# Patient Record
Sex: FEMALE | Race: WHITE
[De-identification: names, ages, dates, MRNs, and addresses within clinical notes are randomized per-mention and may not be internally consistent; named-entity substitution may affect disease eponyms.]

---

## 2019-02-10 ENCOUNTER — HOSPITAL ENCOUNTER (INPATIENT)
Dept: HOSPITAL 91 - FTE | Age: 39
LOS: 1 days | Discharge: HOME | DRG: 446 | End: 2019-02-11
Payer: COMMERCIAL

## 2019-02-10 ENCOUNTER — HOSPITAL ENCOUNTER (INPATIENT)
Dept: HOSPITAL 10 - FTE | Age: 39
LOS: 1 days | Discharge: HOME | DRG: 446 | End: 2019-02-11
Attending: INTERNAL MEDICINE | Admitting: INTERNAL MEDICINE
Payer: MEDICAID

## 2019-02-10 VITALS
HEIGHT: 60 IN | WEIGHT: 175.27 LBS | BODY MASS INDEX: 34.41 KG/M2 | HEIGHT: 60 IN | WEIGHT: 175.27 LBS | BODY MASS INDEX: 34.41 KG/M2

## 2019-02-10 VITALS — DIASTOLIC BLOOD PRESSURE: 64 MMHG | SYSTOLIC BLOOD PRESSURE: 108 MMHG | HEART RATE: 89 BPM | RESPIRATION RATE: 18 BRPM

## 2019-02-10 VITALS — RESPIRATION RATE: 18 BRPM | SYSTOLIC BLOOD PRESSURE: 101 MMHG | DIASTOLIC BLOOD PRESSURE: 61 MMHG | HEART RATE: 98 BPM

## 2019-02-10 DIAGNOSIS — Z53.29: ICD-10-CM

## 2019-02-10 DIAGNOSIS — K80.10: Primary | ICD-10-CM

## 2019-02-10 DIAGNOSIS — E66.9: ICD-10-CM

## 2019-02-10 LAB
ADD MAN DIFF?: NO
ADD UMIC: NO
ALANINE AMINOTRANSFERASE: 25 IU/L (ref 13–69)
ALBUMIN/GLOBULIN RATIO: 1.4
ALBUMIN: 5.2 G/DL (ref 3.3–4.9)
ALKALINE PHOSPHATASE: 83 IU/L (ref 42–121)
ANION GAP: 15 (ref 5–13)
ASPARTATE AMINO TRANSFERASE: 37 IU/L (ref 15–46)
BASOPHIL #: 0 10^3/UL (ref 0–0.1)
BASOPHILS %: 0.1 % (ref 0–2)
BILIRUBIN,DIRECT: 0 MG/DL (ref 0–0.2)
BILIRUBIN,TOTAL: 0.2 MG/DL (ref 0.2–1.3)
BLOOD UREA NITROGEN: 15 MG/DL (ref 7–20)
CALCIUM: 10.2 MG/DL (ref 8.4–10.2)
CARBON DIOXIDE: 23 MMOL/L (ref 21–31)
CHLORIDE: 105 MMOL/L (ref 97–110)
CREATININE: 0.6 MG/DL (ref 0.44–1)
EOSINOPHILS #: 0 10^3/UL (ref 0–0.5)
EOSINOPHILS %: 0 % (ref 0–7)
GLOBULIN: 3.7 G/DL (ref 1.3–3.2)
GLUCOSE: 137 MG/DL (ref 70–220)
HEMATOCRIT: 41 % (ref 37–47)
HEMOGLOBIN: 13.3 G/DL (ref 12–16)
IMMATURE GRANS #M: 0.07 10^3/UL (ref 0–0.03)
IMMATURE GRANS % (M): 0.5 % (ref 0–0.43)
LIPASE: 61 U/L (ref 23–300)
LYMPHOCYTES #: 1 10^3/UL (ref 0.8–2.9)
LYMPHOCYTES %: 6.9 % (ref 15–51)
MEAN CORPUSCULAR HEMOGLOBIN: 29.3 PG (ref 29–33)
MEAN CORPUSCULAR HGB CONC: 32.4 G/DL (ref 32–37)
MEAN CORPUSCULAR VOLUME: 90.3 FL (ref 82–101)
MEAN PLATELET VOLUME: 11.5 FL (ref 7.4–10.4)
MONOCYTE #: 0.6 10^3/UL (ref 0.3–0.9)
MONOCYTES %: 4 % (ref 0–11)
NEUTROPHIL #: 13.2 10^3/UL (ref 1.6–7.5)
NEUTROPHILS %: 88.5 % (ref 39–77)
NUCLEATED RED BLOOD CELLS #: 0 10^3/UL (ref 0–0)
NUCLEATED RED BLOOD CELLS%: 0 /100WBC (ref 0–0)
PLATELET COUNT: 264 10^3/UL (ref 140–415)
POTASSIUM: 3.5 MMOL/L (ref 3.5–5.1)
RED BLOOD COUNT: 4.54 10^6/UL (ref 4.2–5.4)
RED CELL DISTRIBUTION WIDTH: 13.3 % (ref 11.5–14.5)
SODIUM: 143 MMOL/L (ref 135–144)
TOTAL PROTEIN: 8.9 G/DL (ref 6.1–8.1)
UR ASCORBIC ACID: NEGATIVE MG/DL
UR BACTERIA: (no result) /HPF
UR BILIRUBIN (DIP): NEGATIVE MG/DL
UR BLOOD (DIP): NEGATIVE MG/DL
UR CLARITY: (no result)
UR COLOR: YELLOW
UR GLUCOSE (DIP): (no result) MG/DL
UR KETONES (DIP): (no result) MG/DL
UR LEUKOCYTE ESTERASE (DIP): NEGATIVE LEU/UL
UR MUCUS: (no result) /HPF
UR NITRITE (DIP): NEGATIVE MG/DL
UR PH (DIP): 7 (ref 5–9)
UR RBC: 1 /HPF (ref 0–5)
UR SPECIFIC GRAVITY (DIP): 1.02 (ref 1–1.03)
UR SQUAMOUS EPITHELIAL CELL: (no result) /HPF
UR TOTAL PROTEIN (DIP): NEGATIVE MG/DL
UR UROBILINOGEN (DIP): NEGATIVE MG/DL
UR WBC: 2 /HPF (ref 0–5)
WHITE BLOOD COUNT: 14.9 10^3/UL (ref 4.8–10.8)

## 2019-02-10 PROCEDURE — 80053 COMPREHEN METABOLIC PANEL: CPT

## 2019-02-10 PROCEDURE — 96375 TX/PRO/DX INJ NEW DRUG ADDON: CPT

## 2019-02-10 PROCEDURE — 85025 COMPLETE CBC W/AUTO DIFF WBC: CPT

## 2019-02-10 PROCEDURE — 81025 URINE PREGNANCY TEST: CPT

## 2019-02-10 PROCEDURE — 81001 URINALYSIS AUTO W/SCOPE: CPT

## 2019-02-10 PROCEDURE — 99285 EMERGENCY DEPT VISIT HI MDM: CPT

## 2019-02-10 PROCEDURE — 36415 COLL VENOUS BLD VENIPUNCTURE: CPT

## 2019-02-10 PROCEDURE — 83036 HEMOGLOBIN GLYCOSYLATED A1C: CPT

## 2019-02-10 PROCEDURE — 81003 URINALYSIS AUTO W/O SCOPE: CPT

## 2019-02-10 PROCEDURE — 76705 ECHO EXAM OF ABDOMEN: CPT

## 2019-02-10 PROCEDURE — 83690 ASSAY OF LIPASE: CPT

## 2019-02-10 PROCEDURE — 96365 THER/PROPH/DIAG IV INF INIT: CPT

## 2019-02-10 RX ADMIN — KETOROLAC TROMETHAMINE 1 MG: 30 INJECTION, SOLUTION INTRAMUSCULAR at 11:40

## 2019-02-10 RX ADMIN — POTASSIUM ACETATE 1 MLS/HR: 3.93 INJECTION, SOLUTION, CONCENTRATE INTRAVENOUS at 23:00

## 2019-02-10 RX ADMIN — POTASSIUM ACETATE 1 MLS/HR: 3.93 INJECTION, SOLUTION, CONCENTRATE INTRAVENOUS at 16:22

## 2019-02-10 RX ADMIN — CALCIUM GLUCONATE SCH MLS/HR: 94 INJECTION, SOLUTION INTRAVENOUS at 23:00

## 2019-02-10 RX ADMIN — PIPERACILLIN SODIUM AND TAZOBACTAM SODIUM 1 MLS/HR: 3; .375 INJECTION, POWDER, LYOPHILIZED, FOR SOLUTION INTRAVENOUS at 11:40

## 2019-02-10 RX ADMIN — PIPERACILLIN SODIUM AND TAZOBACTAM SODIUM 1 MLS/HR: 3; .375 INJECTION, POWDER, LYOPHILIZED, FOR SOLUTION INTRAVENOUS at 18:16

## 2019-02-10 RX ADMIN — THIAMINE HYDROCHLORIDE 1 MLS/HR: 100 INJECTION, SOLUTION INTRAMUSCULAR; INTRAVENOUS at 11:40

## 2019-02-10 RX ADMIN — CALCIUM GLUCONATE SCH MLS/HR: 94 INJECTION, SOLUTION INTRAVENOUS at 16:22

## 2019-02-10 RX ADMIN — PIPERACILLIN SODIUM AND TAZOBACTAM SODIUM SCH MLS/HR: 3; .375 INJECTION, POWDER, LYOPHILIZED, FOR SOLUTION INTRAVENOUS at 18:16

## 2019-02-10 NOTE — EN
Date/Time of Note


Date/Time of Note


DATE: 2/10/19 


TIME: 11:27





ER Progress Note


Observation Note





Subjective: This patient was evaluated by myself in conjunction with the PA.  


Briefly, this is a 38-year-old female presenting with right upper quadrant pain 


for a few days.  The patient was found to have cholecystitis.





Family history: As indicated on the initial history and physical of this ER 


visit





Objective:    Vital signs reviewed


Const:   No apparent distress, well-developed, well-nourished


Head:   Normocephalic, Atraumatic 


Eyes:   Normal Conjunctiva.


ENT:   Normal External Ears, Nose and Mouth.


Neck:   No meningismus.


Resp:   Symmetric chest wall wise, no audible wheezes


Cardio:   Regular rate


Abd:   No abdominal distension


Skin:   No petechiae or rashes


Ext:   No cyanosis, or edema


Neur:   Awake.  No facial droop.  Normal strength and sensation.





Assessment: Cholecystitis





MDM





The patient's presentation warrants further investigation. Previous medical 


records, if available, were reviewed.





LABS





The patient's laboratory testing was obtained and reviewed. No emergent 


treatment was required unless described below.





CBC:   Leukocytosis, concerning for an infectious etiology.  No anemia or 


thrombocytopenia.


CMP:   No E/o severe acidosis or alkalosis or renal failure or liver disease or 


diabetic ketoacidosis


Lipase:   No E/o pancreatitis





IMAGING





Imaging and Radiology interpretation reviewed.





US GB


FINDINGS: 


Pancreas: The head and body of the pancreas are unremarkable. Tail is obscured 


by shadowing bowel gas.


Liver: Liver demonstrates normal size and echotexture. No parenchymal lesions 


are identified. Normal directional flow toward the liver is demonstrated in the 


main portal vein.


Gallbladder: Multiple echogenic gallstones, including a non mobile stone within 


the neck of the gallbladder. The gallbladder wall measures 5 mm thickness. 


Sonographer reports positive Menendez's sign. There is trace pericholecystic 


fluid.


Biliary system: No significant dilatation of the intrahepatic or extrahepatic 


biliary system. Common bile duct measures 4 mm.


Right Kidney: Measures 10.6 cm in length. No hydronephrosis, intrarenal 


calcification or parenchymal lesion. No visible perinephric fluid. 


IMPRESSION: Cholelithiasis and non mobile stone within the neck of the 


gallbladder. Mild wall thickening, trace pericholecystic fluid and focal 


tenderness consistent with acute cholecystitis.


Electronically viewed and signed by Physician Ese on 02/10/2019 


11:01 





TREATMENT/DISPOSITION





The patient presents for acute cholecystitis.  The patient was given Zosyn in 


the emergency department.  The patient does have a leukocytosis, but her vital 


signs are stable.  She does not meet criteria for systemic inflammatory response


syndrome.  I have low suspicion for endorgan damage.  I do not believe the 


patient is septic and I do not feel that a full septic workup is required.





At this time, I feel that the patient requires admission for further evaluation 


and management. The patient will be admitted to Panel in accordance with the 


patient's insurance.  The patient was accepted by Dr. Monreal at 1415PM on 


2/10/2019.





The on-call general surgeon, Dr. Mcgee, was consulted on the case.  The 


patient will be evaluated for likely operative intervention.





Please see PA note for further detail.





Disclaimer: Inadvertent spelling and grammatical errors are likely due to 


EHR/dictation software use and do not reflect on the overall quality of patient 


care. Note that the electronic time recorded on this note does not necessarily 


reflect the actual time of the patient encounter.











RODRICK ROSAS MD              Feb 10, 2019 11:34

## 2019-02-10 NOTE — CONS
Assessment/Plan


Assessment/Plan


Hospital Course (Demo Recall)


1.  Symptomatic cholelithiasis with likely cholecystitis:


-Discussed surgical option with patient.  Patient currently refusing surgery at 


this time.>  We will continue to monitor


-\Antibiotics


-Diet okay-low-fat low-cholesterol


2.  Leukocytosis:


-As above


3.  Abdominal pain:


-Pain management


4.  Obesity BMI: 34


-diet and exercise optimization


-encourage weight loss


5.  Schizophrenia


-Psychiatric optimization








Thank you.  Patient seen and examined in collaboration with Dr. Yang Cordova.





Consultation Date/Type/Reason


Admit Date/Time





Date of Consultation:  Feb 10, 2019


Type of Consult


Surgical


Reason for Consultation


Abdominal pain, cholelithiasis likely cholecystitis


Requesting Provider:  MELBA COHEN PA-C


Date/Time of Note


DATE: 2/10/19 


TIME: 17:06





Hx of Present Illness


Chandni Cisneros is a 38-year-old woman with past medical history of schizophrenia 


and left eye surgery who presented to the ED with reports of abdominal pain 


times 2 days.  Abdominal pain is predominantly in the right upper quadrant 


strong and persistent in nature.  Aggravating factors include pain with eating. 


Associated symptoms include nausea with vomiting yellow emesis.  She denies 


fevers, chills, congested cough, chest pain, palpitations, diarrhea, change in 


bowel or bladder habits, hematochezia, melena, hematemesis, skin or scleral 


changes.  Laboratory findings were significant for leukocytosis.  Ultrasound of 


the abdomen shows cholelithiasis with nonmobile stone within the neck of the 


gallbladder with mild wall thickening and trace pericholecystic fluid.  General 


surgery was asked to evaluate.


12 point review of systems was performed and is negative except as stated in 


HPI.





Past Medical History


Obesity


As above


Medications





Current Medications


Ondansetron HCl (Zofran Inj) 4 mg BRIDGE ORDER PRN IV NAUSEA/VOMITING;  Start 


2/10/19 at 14:30;  Stop 2/11/19 at 14:29


Acetaminophen (Tylenol Tab) 650 mg ER BRIDGE PRN PO .MILD PAIN 1-3 OR TEMP;  


Start 2/10/19 at 14:30;  Stop 2/11/19 at 14:29


Sodium Chloride 1,000 ml @  125 mls/hr Q8H IV  Last administered on 2/10/19at 


16:22; Admin Dose 125 MLS/HR;  Start 2/10/19 at 15:00


IV Flush (NS 3 ml) 3 ml PER PROTOCOL IV ;  Start 2/10/19 at 15:00


Morphine Sulfate (morphine) 2 mg Q4H  PRN IV .SEVERE PAIN 7-10;  Start 2/10/19 


at 15:00


Piperacillin Sod/ Tazobactam Sod 100 ml @  25 mls/hr TID@02,10,18 IVPB ;  Start 


2/10/19 at 18:00


Allergies:  


Coded Allergies:  


     No Known Allergy (Unverified , 12/8/14)





Past Surgical History


As above





Family History


Significant Family History:  no pertinent family hx





Social History


Alcohol Use:  none


Smoking Status:  Never smoker


Drug Use:  none





Exam/Review of Systems


Exam


Vitals





Vital Signs


  Date      Temp  Pulse  Resp  B/P (MAP)   Pulse Ox  O2          O2 Flow    FiO2


Time                                                 Delivery    Rate


   2/10/19  98.8     83    18      108/61        98  Room Air


     15:08                           (77)





Constitutional:  alert, oriented, well developed, obese


Psych:  nl mood/affect, anxiety (Min)


Head:  normocephalic, atraumatic


Eyes:  nl conjunctiva, EOMI, nl lids, nl sclera


ENMT:  nl external ears & nose, nl lips & teeth, nl nasal mucosa & septum, 


mucosa pink and moist


Neck:  supple, non-tender; 


   No jvd


Respiratory:  normal air movement; 


   No congested cough


Cardiovascular:  regular rate and rhythm, nl pulses


Gastrointestinal:  soft, distended (Minimal), tender (Right upper quadrant; 


negative Menendez's by palpation); 


   No firm


Genitourinary - Female:  nl external genitalia


Musculoskeletal:  nl extremities to inspection, nl gait and stance


Extremities:  normal pulses


Neurological:  nl mental status, nl speech, nl strength


Skin:  No rash or lesions


Lymph:  nl lymph nodes





Results


Result Diagram:  


2/10/19 1026                                                                    


           2/10/19 1026





Results 24hrs





Laboratory Tests


Test
                                2/10/19
10:15  2/10/19
10:26  2/10/19
10:34


Urine Color                     YELLOW


Urine Clarity
                  SLIGHTLY
CLOUDY  A  
              



Urine pH                                     7.0


Urine Specific Gravity                     1.018


Urine Ketones                   TRACE  A


Urine Nitrite                   NEGATIVE


Urine Bilirubin                 NEGATIVE


Urine Urobilinogen              NEGATIVE


Urine Leukocyte Esterase        NEGATIVE


Urine Microscopic RBC                          1


Urine Microscopic WBC                          2


Urine Squamous                  FEW  
              
              



Epithelial
Cells


Urine Bacteria                  FEW  A


Urine Mucus                     FEW  A


Urine Hemoglobin                NEGATIVE


Urine Glucose                                1+  H


Urine Total Protein             NEGATIVE


White Blood Count                                         14.9  H


Red Blood Count                                           4.54  #


Hemoglobin                                                13.3  #


Hematocrit                                                41.0  #


Mean Corpuscular Volume                                    90.3


Mean Corpuscular Hemoglobin                                29.3


Mean Corpuscular                
                         32.4  
  



Hemoglobin
Concent


Red Cell Distribution Width                                13.3


Platelet Count                                              264


Mean Platelet Volume                                      11.5  H


Immature Granulocytes %                                  0.500  H


Neutrophils %                                             88.5  H


Lymphocytes %                                              6.9  L


Monocytes %                                                 4.0


Eosinophils %                                               0.0


Basophils %                                                 0.1


Nucleated Red Blood Cells %                                 0.0


Immature Granulocytes #                                  0.070  H


Neutrophils #                                             13.2  H


Lymphocytes #                                               1.0


Monocytes #                                                 0.6


Eosinophils #                                               0.0


Basophils #                                                 0.0


Nucleated Red Blood Cells #                                 0.0


Sodium Level                                                143


Potassium Level                                             3.5


Chloride Level                                              105


Carbon Dioxide Level                                         23


Anion Gap                                                   15  H


Blood Urea Nitrogen                                          15


Creatinine                                                 0.60


Est Glomerular Filtrat          
                   > 60  
        



Rate
mL/min


Glucose Level                                               137


Calcium Level                                              10.2


Total Bilirubin                                             0.2


Direct Bilirubin                                           0.00


Indirect Bilirubin                                          0.2


Aspartate Amino                 
                           37  
  



Transf
(AST/SGOT)


Alanine                         
                           25  
  



Aminotransferase
(ALT/SGPT)


Alkaline Phosphatase                                         83


Total Protein                                              8.9  H


Albumin                                                    5.2  H


Globulin                                                  3.70  H


Albumin/Globulin Ratio                                     1.40


Lipase                                                       61


POC Beta HCG, Qualitative                                          NEGATIVE








Medications


Medication





Current Medications


Ondansetron HCl (Zofran Inj) 4 mg BRIDGE ORDER PRN IV NAUSEA/VOMITING;  Start 


2/10/19 at 14:30;  Stop 2/11/19 at 14:29


Acetaminophen (Tylenol Tab) 650 mg ER BRIDGE PRN PO .MILD PAIN 1-3 OR TEMP;  


Start 2/10/19 at 14:30;  Stop 2/11/19 at 14:29


Sodium Chloride 1,000 ml @  125 mls/hr Q8H IV  Last administered on 2/10/19at 


16:22; Admin Dose 125 MLS/HR;  Start 2/10/19 at 15:00


IV Flush (NS 3 ml) 3 ml PER PROTOCOL IV ;  Start 2/10/19 at 15:00


Morphine Sulfate (morphine) 2 mg Q4H  PRN IV .SEVERE PAIN 7-10;  Start 2/10/19 


at 15:00


Piperacillin Sod/ Tazobactam Sod 100 ml @  25 mls/hr TID@02,10,18 IVPB ;  Start 


2/10/19 at 18:00











LILIAN LAINEZ NP       Feb 10, 2019 17:14

## 2019-02-10 NOTE — HP
Date/Time of Note


Date/Time of Note


DATE: 2/10/19 


TIME: 15:31





Assessment/Plan


VTE Prophylaxis


Pharmacological prophylaxis:  heparin





Lines/Catheters


IV Catheter Type (from Nrsg):  Saline Lock





Assessment/Plan


Hospital Course


37 yo female without pmh presenting with cholecystitis


- IV zosyn


- Fluids


- Pain control


- Dr Cordova consulted


- NPO for now


Result Diagram:  


2/10/19 1026                                                                    


           2/10/19 1026





Results 24hrs





Laboratory Tests


Test
                                2/10/19
10:15  2/10/19
10:26  2/10/19
10:34


Urine Color                     YELLOW


Urine Clarity
                  SLIGHTLY
CLOUDY  A  
              



Urine pH                                     7.0


Urine Specific Gravity                     1.018


Urine Ketones                   TRACE  A


Urine Nitrite                   NEGATIVE


Urine Bilirubin                 NEGATIVE


Urine Urobilinogen              NEGATIVE


Urine Leukocyte Esterase        NEGATIVE


Urine Microscopic RBC                          1


Urine Microscopic WBC                          2


Urine Squamous                  FEW  
              
              



Epithelial
Cells


Urine Bacteria                  FEW  A


Urine Mucus                     FEW  A


Urine Hemoglobin                NEGATIVE


Urine Glucose                                1+  H


Urine Total Protein             NEGATIVE


White Blood Count                                         14.9  H


Red Blood Count                                           4.54  #


Hemoglobin                                                13.3  #


Hematocrit                                                41.0  #


Mean Corpuscular Volume                                    90.3


Mean Corpuscular Hemoglobin                                29.3


Mean Corpuscular                
                         32.4  
  



Hemoglobin
Concent


Red Cell Distribution Width                                13.3


Platelet Count                                              264


Mean Platelet Volume                                      11.5  H


Immature Granulocytes %                                  0.500  H


Neutrophils %                                             88.5  H


Lymphocytes %                                              6.9  L


Monocytes %                                                 4.0


Eosinophils %                                               0.0


Basophils %                                                 0.1


Nucleated Red Blood Cells %                                 0.0


Immature Granulocytes #                                  0.070  H


Neutrophils #                                             13.2  H


Lymphocytes #                                               1.0


Monocytes #                                                 0.6


Eosinophils #                                               0.0


Basophils #                                                 0.0


Nucleated Red Blood Cells #                                 0.0


Sodium Level                                                143


Potassium Level                                             3.5


Chloride Level                                              105


Carbon Dioxide Level                                         23


Anion Gap                                                   15  H


Blood Urea Nitrogen                                          15


Creatinine                                                 0.60


Est Glomerular Filtrat          
                   > 60  
        



Rate
mL/min


Glucose Level                                               137


Calcium Level                                              10.2


Total Bilirubin                                             0.2


Direct Bilirubin                                           0.00


Indirect Bilirubin                                          0.2


Aspartate Amino                 
                           37  
  



Transf
(AST/SGOT)


Alanine                         
                           25  
  



Aminotransferase
(ALT/SGPT)


Alkaline Phosphatase                                         83


Total Protein                                              8.9  H


Albumin                                                    5.2  H


Globulin                                                  3.70  H


Albumin/Globulin Ratio                                     1.40


Lipase                                                       61


POC Beta HCG, Qualitative                                          NEGATIVE








HPI/ROS


Admit Date/Time


Admit Date/Time








Hx of Present Illness


37 yo female without pmh who presents with two days abdominal pain


Patient generally in good health.  Over past two days has developed RUQ pain 


associated with vomiting.  Came to ED where imaging shows cholecystitis.  


Started on zosyn.  Given pain control.  Currently feels well no complaints





PMH/Family/Social


Past Medical History


Medical History:  no pertinent history


Medications





Current Medications


Ondansetron HCl (Zofran Inj) 4 mg BRIDGE ORDER PRN IV NAUSEA/VOMITING;  Start 


2/10/19 at 14:30;  Stop 2/11/19 at 14:29


Acetaminophen (Tylenol Tab) 650 mg ER BRIDGE PRN PO .MILD PAIN 1-3 OR TEMP;  


Start 2/10/19 at 14:30;  Stop 2/11/19 at 14:29


Sodium Chloride 1,000 ml @  125 mls/hr Q8H IV ;  Start 2/10/19 at 15:00


IV Flush (NS 3 ml) 3 ml PER PROTOCOL IV ;  Start 2/10/19 at 15:00


Morphine Sulfate (morphine) 2 mg Q4H  PRN IV .SEVERE PAIN 7-10;  Start 2/10/19 


at 15:00


Piperacillin Sod/ Tazobactam Sod 100 ml @  25 mls/hr TID@02,10,18 IVPB ;  Start 


2/10/19 at 18:00


Coded Allergies:  


     No Known Allergy (Unverified , 12/8/14)





Past Surgical History


Past Surgical Hx:  no surgical history





Family History


Significant Family History:  no pertinent family hx





Social History


Alcohol Use:  none


Smoking Status:  Never smoker


Drug Use:  none





Exam/Review of Systems


Vital Signs


Vitals





Vital Signs


  Date      Temp  Pulse  Resp  B/P (MAP)   Pulse Ox  O2          O2 Flow    FiO2


Time                                                 Delivery    Rate


   2/10/19  98.8     83    18      108/61        98  Room Air


     15:08                           (77)








Exam


Constitutional:  alert, oriented, well developed


Psych:  no complaints, nl mood/affect


Head:  normocephalic, atraumatic


Eyes:  nl conjunctiva, EOMI, nl lids, nl sclera, PERRL


ENMT:  nl external ears & nose, nl lips & teeth, nl nasal mucosa & septum


Neck:  supple, non-tender


Respiratory:  clear to auscultation, normal air movement


Cardiovascular:  regular rate and rhythm, nl pulses


Gastrointestinal:  soft, nl liver, spleen, non-tender


Musculoskeletal:  nl extremities to inspection


Extremities:  normal pulses


Neurological:  CNS II-XII intact, nl mental status, nl speech, nl strength


Skin:  nl turgor; 


   No rash or lesions


Lymph:  nl lymph nodes











MARIAH SILVER MD          Feb 10, 2019 15:33

## 2019-02-10 NOTE — NUR
EOSS: PT'S HEMODYNAMICS AND RESPIR. STATUS ARE STABLE. NO COMPLICATIONS. NO CHANGES IN 
CONDITION. WILL CONT. MONITORING. WILL CONT. W/ THE PLAN OF CARE.

## 2019-02-10 NOTE — NUR
PT ADMITTED FROM ER.A,A,OX4. NOT IN ANY ACUTE DISTRESS. THE PLAN OF CARE DISCUSSED W/ THE 
PT, VERBALIZED UNDERSTANDING. CALL LIGHT IN REACH. PT ENCOURAGED TO CALL FOR ASSISTANCE. 
WILL CONT. MONITORING. WILL CONT. W/ THE PLAN OF CARE.

## 2019-02-10 NOTE — ERD
ER Documentation


Chief Complaint


Chief Complaint





NAUSEA AND VOMITING, ABD PAIN X2 DAYS





HPI


38-year-old female presenting with nausea and vomiting and epigastric abdominal 


pain times 2 days.  Patient has no fevers.  She is never had this pain before.  


Denies any chest pain or shortness of breath.  Has not taken medications for 


symptoms.  Medical history: Schizophrenia.  Surgical history left eye surgery.  


NKDA.  Social history denies





ROS


All systems reviewed and are negative except as per history of present illness.





Allergies


Allergies:  


Coded Allergies:  


     No Known Allergy (Unverified , 14)





PMhx/Soc


Medical and Surgical Hx:  pt denies Medical Hx, pt denies Surgical Hx


Hx Psychiatric Problems:  Yes (UNKNOWN)


Hx Alcohol Use:  No


Hx Substance Use:  No


Hx Tobacco Use:  No





FmHx


Family History:  No diabetes, No coronary disease, No other





Physical Exam


Vitals





Vital Signs


  Date      Temp  Pulse  Resp  B/P (MAP)   Pulse Ox  O2          O2 Flow    FiO2


Time                                                 Delivery    Rate


   2/10/19  98.5     96    18      142/65       100


     09:56                           (90)





Physical Exam


GENERAL: The patient is well-appearing, well-nourished, in no acute distress


HEENT: Atraumatic.  Conjunctivae are pink.  Pupils equal, round, and reactive to


light.  There is no scleral icterus.  Tympanic membranes clear bilaterally.  


Oropharynx clear.  


NECK: C-spine is soft and supple.  There is no meningismus.  There is no 


cervical lymphadenopathy. 


CHEST: Clear to auscultation bilaterally.  There are no rales, wheezes or 


rhonchi.


HEART: Regular rate and rhythm.  No murmurs, clicks, rubs or gallops.  


ABDOMEN: Normal active bowel sounds.  No distention.  No organomegaly.  Tender 


to palpation in epigastric region.


Result Diagram:  


2/10/19 1026                                                                    


           2/10/19 1026





Results 24 hrs





Laboratory Tests


Test
                               2/10/19
10:15   2/10/19
10:26  2/10/19
10:34


Urine Color                       YELLOW


Urine Clarity
                    SLIGHTLY
CLOUDY  
               



Urine pH                                     7.0


Urine Specific Gravity                     1.018


Urine Ketones                     TRACE mg/dL


Urine Nitrite                     NEGATIVE mg/dL


Urine Bilirubin                   NEGATIVE mg/dL


Urine Urobilinogen                NEGATIVE mg/dL


Urine Leukocyte Esterase
         NEGATIVE
Conor/ul  
               



Urine Microscopic RBC                     1 /HPF


Urine Microscopic WBC                     2 /HPF


Urine Squamous Epithelial
Cells   FEW /HPF 
       
               



Urine Bacteria                    FEW /HPF


Urine Mucus                       FEW /HPF


Urine Hemoglobin                  NEGATIVE mg/dL


Urine Glucose                           1+ mg/dL


Urine Total Protein               NEGATIVE mg/dl


White Blood Count                                   14.9 10^3/ul


Red Blood Count                                     4.54 10^6/ul


Hemoglobin                                             13.3 g/dl


Hematocrit                                                41.0 %


Mean Corpuscular Volume                                  90.3 fl


Mean Corpuscular Hemoglobin                              29.3 pg


Mean Corpuscular                  
                   32.4 g/dl 
  



Hemoglobin
Concent


Red Cell Distribution Width                               13.3 %


Platelet Count                                       264 10^3/UL


Mean Platelet Volume                                     11.5 fl


Immature Granulocytes %                                  0.500 %


Neutrophils %                                             88.5 %


Lymphocytes %                                              6.9 %


Monocytes %                                                4.0 %


Eosinophils %                                              0.0 %


Basophils %                                                0.1 %


Nucleated Red Blood Cells %                          0.0 /100WBC


Immature Granulocytes #                            0.070 10^3/ul


Neutrophils #                                       13.2 10^3/ul


Lymphocytes #                                        1.0 10^3/ul


Monocytes #                                          0.6 10^3/ul


Eosinophils #                                        0.0 10^3/ul


Basophils #                                          0.0 10^3/ul


Nucleated Red Blood Cells #                          0.0 10^3/ul


Sodium Level                                          143 mmol/L


Potassium Level                                       3.5 mmol/L


Chloride Level                                        105 mmol/L


Carbon Dioxide Level                                   23 mmol/L


Anion Gap                                                     15


Blood Urea Nitrogen                                     15 mg/dl


Creatinine                                            0.60 mg/dl


Est Glomerular Filtrat            
                > 60 mL/min 
   



Rate
mL/min


Glucose Level                                          137 mg/dl


Calcium Level                                         10.2 mg/dl


Total Bilirubin                                        0.2 mg/dl


Direct Bilirubin                                      0.00 mg/dl


Indirect Bilirubin                                     0.2 mg/dl


Aspartate Amino                   
                     37 IU/L 
  



Transf
(AST/SGOT)


Alanine                           
                     25 IU/L 
  



Aminotransferase
(ALT/SGPT)


Alkaline Phosphatase                                     83 IU/L


Total Protein                                           8.9 g/dl


Albumin                                                 5.2 g/dl


Globulin                                               3.70 g/dl


Albumin/Globulin Ratio                                      1.40


Lipase                                                    61 U/L


POC Beta HCG, Qualitative                                          NEGATIVE





Current Medications


 Medications
   Dose
          Sig/Naya
       Start Time
   Status  Last


 (Trade)       Ordered        Route
 PRN     Stop Time              Admin
Dose


                              Reason                                Admin


 Sodium         1,000 ml @ 
   Q1H STAT
      2/10/19       DC           2/10/19


Chloride       1,000 mls/hr   IV
            11:30
                       11:40



                                             2/10/19 12:29


 Ketorolac
     30 mg          ONCE  STAT
    2/10/19       DC           2/10/19


Tromethamine
                 IV
            11:30
                       11:40



 (Toradol)                                   2/10/19 11:31


 Piperacillin   100 ml @ 
     ONCE  ONCE
    2/10/19       DC           2/10/19


Sod/
          200 mls/hr     IVPB
          12:00
                       11:40



Tazobactam                                   2/10/19 12:29


Sod


 Ondansetron    4 mg           BRIDGE ORDER   2/10/19                



HCl
  (Zofran                 PRN
 IV
       14:30



Inj)                          NAUSEA/VOMITI  19 14:29


                              NG


                650 mg         ER BRIDGE      2/10/19                



Acetaminophen                 PRN
 PO
       14:30




  (Tylenol                   .MILD PAIN     19 14:29


Tab)                          1-3 OR TEMP


 Sodium         1,000 ml @ 
   Q8H
 IV
       2/10/19       UNV      



Chloride       125 mls/hr                    15:00



 IV Flush
      3 ml           PER            2/10/19       UNV      



(NS 3 ml)                     PROTOCOL
 IV
  15:00



 Morphine       2 mg           Q4H  PRN
      2/10/19       UNV      



Sulfate
                      IV
 .SEVERE    15:00



(morphine)                    PAIN 7-10


 Piperacillin   100 ml @ 
                    2/10/19       UNV      



Sod/
          25 mls/hr      TID@02,10,
  18:00



Tazobactam                    IVPB



Sod








Procedures/MDM


                            DIAGNOSTIC IMAGING REPORT





Patient: JACKELINE TESFAYE   : 1980   Age: 38  Sex: F                        


       MR #:    [id number]   Acct #:   [id number]    DOS: 02/10/19 1015


Ordering MD: MONSERRAT COHEN PA-C   Location:  [facility]   Room/Bed:            


                               


                                        


PROCEDURE:   Ultrasound gallbladder


 


CLINICAL INDICATION:  abdominal pain    


 


TECHNIQUE:  Gray scale, color flow and Doppler ultrasound images of the abdomen.





 


COMPARISON:   None 


 


FINDINGS:


 


Pancreas: The head and body of the pancreas are unremarkable. Tail is obscured 


by shadowing bowel gas.


 


Liver: Liver demonstrates normal size and echotexture. No parenchymal lesions 


are identified. Normal directional flow toward the liver is demonstrated in the 


main portal vein.


 


Gallbladder: Multiple echogenic gallstones, including a non mobile stone within 


the neck of the gallbladder. The gallbladder wall measures 5 mm thickness. 


Sonographer reports positive Menendez's sign. There is trace pericholecystic 


fluid.


 


Biliary system: No significant dilatation of the intrahepatic or extrahepatic 


biliary system. Common bile duct measures 4 mm.


 


Right Kidney: Measures 10.6 cm in length. No hydronephrosis, intrarenal 


calcification or parenchymal lesion. No visible perinephric fluid. 


Additional findings: None


 


 


IMPRESSION:


 


Cholelithiasis and non mobile stone within the neck of the gallbladder. Mild 


wall thickening, trace pericholecystic fluid and focal tenderness consistent 


with acute cholecystitis.


 





ER Course: 1 L normal saline given ED.  Toradol given ED.  Dr. Limon help 


facilitate admission and patient was admitted and will have surgical cons


ultation.





MDM: 38-year-old female presenting with findings consistent with cholecystitis. 


Patient will be admitted for higher level of care and surgical consultation.  I 


have low suspicion for other abdominal emergencies including but not limited to 


choledocholithiasis, pancreatitis or choledocholithiasis.  I have low suspicion 


for bowel obstruction or appendicitis.  Patient is admitted for higher level 


care and surgical consultation.  Patient is stable at the time of admission.  I 


have low suspicion for cardiac or pulmonary emergency.  Patient understood and 


complied. All questions answered at discharge











MELBA COHEN PA-C       Feb 10, 2019 15:11

## 2019-02-11 VITALS — SYSTOLIC BLOOD PRESSURE: 103 MMHG | RESPIRATION RATE: 18 BRPM | DIASTOLIC BLOOD PRESSURE: 57 MMHG | HEART RATE: 94 BPM

## 2019-02-11 VITALS — RESPIRATION RATE: 18 BRPM | SYSTOLIC BLOOD PRESSURE: 104 MMHG | HEART RATE: 88 BPM | DIASTOLIC BLOOD PRESSURE: 69 MMHG

## 2019-02-11 VITALS — RESPIRATION RATE: 18 BRPM | SYSTOLIC BLOOD PRESSURE: 102 MMHG | HEART RATE: 108 BPM | DIASTOLIC BLOOD PRESSURE: 58 MMHG

## 2019-02-11 LAB
ADD MAN DIFF?: NO
ALANINE AMINOTRANSFERASE: 28 IU/L (ref 13–69)
ALBUMIN/GLOBULIN RATIO: 1.19
ALBUMIN: 3.7 G/DL (ref 3.3–4.9)
ALKALINE PHOSPHATASE: 54 IU/L (ref 42–121)
ANION GAP: 6 (ref 5–13)
ASPARTATE AMINO TRANSFERASE: 25 IU/L (ref 15–46)
BASOPHIL #: 0 10^3/UL (ref 0–0.1)
BASOPHILS %: 0.1 % (ref 0–2)
BILIRUBIN,DIRECT: 0 MG/DL (ref 0–0.2)
BILIRUBIN,TOTAL: 0.6 MG/DL (ref 0.2–1.3)
BLOOD UREA NITROGEN: 10 MG/DL (ref 7–20)
CALCIUM: 8.8 MG/DL (ref 8.4–10.2)
CARBON DIOXIDE: 23 MMOL/L (ref 21–31)
CHLORIDE: 112 MMOL/L (ref 97–110)
CREATININE: 0.75 MG/DL (ref 0.44–1)
EOSINOPHILS #: 0 10^3/UL (ref 0–0.5)
EOSINOPHILS %: 0.1 % (ref 0–7)
GLOBULIN: 3.1 G/DL (ref 1.3–3.2)
GLUCOSE: 105 MG/DL (ref 70–220)
HEMATOCRIT: 35.2 % (ref 37–47)
HEMOGLOBIN A1C: 5.7 % (ref 0–5.9)
HEMOGLOBIN: 11.3 G/DL (ref 12–16)
IMMATURE GRANS #M: 0.04 10^3/UL (ref 0–0.03)
IMMATURE GRANS % (M): 0.4 % (ref 0–0.43)
LYMPHOCYTES #: 2.7 10^3/UL (ref 0.8–2.9)
LYMPHOCYTES %: 29.2 % (ref 15–51)
MEAN CORPUSCULAR HEMOGLOBIN: 29.3 PG (ref 29–33)
MEAN CORPUSCULAR HGB CONC: 32.1 G/DL (ref 32–37)
MEAN CORPUSCULAR VOLUME: 91.2 FL (ref 82–101)
MEAN PLATELET VOLUME: 11.9 FL (ref 7.4–10.4)
MONOCYTE #: 0.9 10^3/UL (ref 0.3–0.9)
MONOCYTES %: 10.3 % (ref 0–11)
NEUTROPHIL #: 5.4 10^3/UL (ref 1.6–7.5)
NEUTROPHILS %: 59.9 % (ref 39–77)
NUCLEATED RED BLOOD CELLS #: 0 10^3/UL (ref 0–0)
NUCLEATED RED BLOOD CELLS%: 0 /100WBC (ref 0–0)
PLATELET COUNT: 217 10^3/UL (ref 140–415)
POTASSIUM: 3.3 MMOL/L (ref 3.5–5.1)
RED BLOOD COUNT: 3.86 10^6/UL (ref 4.2–5.4)
RED CELL DISTRIBUTION WIDTH: 13.6 % (ref 11.5–14.5)
SODIUM: 141 MMOL/L (ref 135–144)
TOTAL PROTEIN: 6.8 G/DL (ref 6.1–8.1)
WHITE BLOOD COUNT: 9.1 10^3/UL (ref 4.8–10.8)

## 2019-02-11 RX ADMIN — PIPERACILLIN SODIUM AND TAZOBACTAM SODIUM 1 MLS/HR: 3; .375 INJECTION, POWDER, LYOPHILIZED, FOR SOLUTION INTRAVENOUS at 10:28

## 2019-02-11 RX ADMIN — PIPERACILLIN SODIUM AND TAZOBACTAM SODIUM SCH MLS/HR: 3; .375 INJECTION, POWDER, LYOPHILIZED, FOR SOLUTION INTRAVENOUS at 10:28

## 2019-02-11 RX ADMIN — POTASSIUM ACETATE 1 MLS/HR: 3.93 INJECTION, SOLUTION, CONCENTRATE INTRAVENOUS at 04:16

## 2019-02-11 RX ADMIN — POTASSIUM CHLORIDE 1 MEQ: 1500 TABLET, EXTENDED RELEASE ORAL at 10:26

## 2019-02-11 RX ADMIN — PIPERACILLIN SODIUM AND TAZOBACTAM SODIUM 1 MLS/HR: 3; .375 INJECTION, POWDER, LYOPHILIZED, FOR SOLUTION INTRAVENOUS at 01:21

## 2019-02-11 RX ADMIN — PIPERACILLIN SODIUM AND TAZOBACTAM SODIUM SCH MLS/HR: 3; .375 INJECTION, POWDER, LYOPHILIZED, FOR SOLUTION INTRAVENOUS at 01:21

## 2019-02-11 RX ADMIN — CALCIUM GLUCONATE SCH MLS/HR: 94 INJECTION, SOLUTION INTRAVENOUS at 04:16

## 2019-02-11 NOTE — NUR
EOSS:



PATIENT BEEN ASSESSED FOR PAIN.  PATIENT DENIES PAIN.  TOLERATING CLEAR LIQUIDS. REMAIN IN 
ZOSYN ANTIBIOTICS AND IVF INFUSING.  HOURLY ROUNDING RENDERED.  FALL PRECAUTION OBSERVED.  
CALL LIGHT WITHIN REACH.

## 2019-02-11 NOTE — PDOCDIS
Discharge Instructions


CONDITION


                 [id number]
Patient Condition:  [id number]
Good








HOME CARE INSTRUCTIONS:


                [id number]
Diet Instructions:  [id number]
Regular








ACTIVITY:


          [id number]
Activity Restrictions:  [id number]
No Restrictions








FOLLOW UP/APPOINTMENTS


Follow-up Plan


FOLLOW UP WITH YOUR PCP IN 1-2 WEEKS











KURT SHIRLEY                  Feb 11, 2019 10:22

## 2019-02-11 NOTE — DS
Date/Time of Note


Date/Time of Note


DATE: 2/11/19 


TIME: 14:59





Discharge Summary


Admission/Discharge Info


Admit Date/Time


Feb 10, 2019 at 14:23


Discharge Date/Time


Feb 11, 2019 at 14:30


Discharge Diagnosis


1.  Symptomatic cholelithiasis with likely cholecystitis:


-Surgery consultation appreciated, Patient is refusing surgery at this time


-Status post IV antibiotics


-DC with p.o. antibiotics


-Pain has resolved


-Outpatient follow-up





2.  Leukocytosis: Resolved


-As above





3.  Obesity BMI: 34


-diet and exercise optimization


-encourage weight loss


Patient Condition:  Good


Hospital Course


Patient is a 38-year-old female with a history of obesity, patient presents with


abdominal pain and was found to have cholelithiasis with likely cholecystitis.  


Patient was seen by surgery and patient did prefer not to have surgery during 


this admission, patient's leukocytosis and abdominal pain did resolve with IV 


antibiotics.  Patient did understand the risk of not having surgery and 


understood that she should follow-up with her PCP and a surgeon as an 


outpatient.  Patient was stable for DC, on day of discharge patient's vitals, 


labs of exam are stable.


Home Meds


Active Scripts


Metronidazole* (Flagyl*) 500 Mg Tablet, 500 MG PO TID for 5 Days, #15 TAB


   Prov:KURT SHIRLEY         2/11/19


Ciprofloxacin Hcl* (Ciprofloxacin Hcl*) 500 Mg Tablet, 500 MG PO BID for 5 Days,


#10 TAB


   Prov:KURT SHIRELY         2/11/19


Follow-up Plan


FOLLOW UP WITH YOUR PCP IN 1-2 WEEKS


Primary Care Provider


Care Physician No Primary


Time spent on discharge:   > 30 minutes











KURT SHIRLEY                  Feb 11, 2019 15:02

## 2019-02-11 NOTE — NUR
DISCHARGE:



Patient given discharge instructions after seen by Dr. Santos with prescriptions for Cipro 
and Flagyl PO. Instructed patient re: taking them. Patient advised to see primary doctor 
post-discharge for follow up and instructed patient to revisit the ER once symptoms recur. 
Patient appeared/stated to be comfortable upon discharge. Patient wants to walk downstairs 
to meet her discharge ride. IV heplock removed from patient's left AC site without 
complications, no bleeding, no oozing- bandaide placed to site.

## 2019-02-11 NOTE — PN
Date/Time of Note


Date/Time of Note


DATE: 2/11/19 


TIME: 13:32





Assessment/Plan


Lines/Catheters


IV Catheter Type (from [facility]):  Peripheral IV


Villagomez in Place (from [facility]):  No





Assessment/Plan


Chief Complaint/Hosp Course


1.  Symptomatic cholelithiasis with likely cholecystitis:


-Discussed surgical option with patient.  Patient currently refusing surgery at 


this time.>  Outpatient follow-up


-Antibiotics


-Diet okay-low-fat low-cholesterol


2.  Leukocytosis: Resolved


-As above


3.  Abdominal pain:


-Pain management


4.  Obesity BMI: 34


-diet and exercise optimization


-encourage weight loss


5.  Schizophrenia


-Psychiatric optimization








Thank you.  Patient seen and examined in collaboration with Dr. Yang Cordova.





Subjective


24 Hr Interval Summary


Abdominal pain improved.  WBC normalized.  Continues to refuse surgical 


intervention at this time.  No fevers, chills, sob, congested cough, cp, 


palpitations, ha, dizziness, nausea, vomiting, diarrhea, dysuria.





Exam/Review of Systems


Vital Signs


Vitals





Vital Signs


  Date      Temp  Pulse  Resp  B/P (MAP)   Pulse Ox  O2          O2 Flow    FiO2


Time                                                 Delivery    Rate


   2/11/19  98.7     88    18      104/69        97


     13:06                           (81)


   2/10/19                                           Room Air


     15:45








Intake and Output





2/10/19


2/10/19


2/11/19





1515:00


23:00


07:00





IntakeIntake Total


1100 ml


450 ml


850 ml





BalanceBalance


1100 ml


450 ml


850 ml














Exam


Free Text/Dictation


Constitutional:  alert, oriented, well developed, obese


Psych:  nl mood/affect, anxiety (Min)


Head:  normocephalic, atraumatic


Eyes:  nl conjunctiva, EOMI, nl lids, nl sclera


ENMT:  nl external ears & nose, nl lips & teeth, nl nasal mucosa & septum, 


mucosa pink and moist


Neck:  supple, non-tender; 


   No jvd


Respiratory:  normal air movement; 


   No congested cough


Cardiovascular:  regular rate and rhythm, nl pulses


Gastrointestinal:  soft, distended (Minimal), tender (Right upper quadrant; 


negative Menendez's by palpation); 


   No firm


Genitourinary - Female:  nl external genitalia


Musculoskeletal:  nl extremities to inspection, nl gait and stance


Extremities:  normal pulses


Neurological:  nl mental status, nl speech, nl strength


Skin:  No rash or lesions


Lymph:  nl lymph nodes





Results


Result Diagram:  


2/11/19 0426                                                                    


           2/11/19 0426














LILIAN LAINEZ NP       Feb 11, 2019 13:36